# Patient Record
Sex: MALE | Race: WHITE | NOT HISPANIC OR LATINO | Employment: FULL TIME | URBAN - METROPOLITAN AREA
[De-identification: names, ages, dates, MRNs, and addresses within clinical notes are randomized per-mention and may not be internally consistent; named-entity substitution may affect disease eponyms.]

---

## 2023-07-27 ENCOUNTER — OFFICE VISIT (OUTPATIENT)
Dept: PRIMARY CARE | Facility: CLINIC | Age: 43
End: 2023-07-27
Payer: COMMERCIAL

## 2023-07-27 VITALS
TEMPERATURE: 98.2 F | DIASTOLIC BLOOD PRESSURE: 74 MMHG | BODY MASS INDEX: 26.98 KG/M2 | RESPIRATION RATE: 18 BRPM | OXYGEN SATURATION: 97 % | HEART RATE: 74 BPM | SYSTOLIC BLOOD PRESSURE: 118 MMHG | HEIGHT: 68 IN | WEIGHT: 178 LBS

## 2023-07-27 DIAGNOSIS — M16.12 PRIMARY OSTEOARTHRITIS OF LEFT HIP: ICD-10-CM

## 2023-07-27 DIAGNOSIS — M25.50 POLYARTHRALGIA: Primary | ICD-10-CM

## 2023-07-27 LAB
ALANINE AMINOTRANSFERASE (SGPT) (U/L) IN SER/PLAS: 17 U/L (ref 10–52)
ALBUMIN (G/DL) IN SER/PLAS: 4.2 G/DL (ref 3.4–5)
ALKALINE PHOSPHATASE (U/L) IN SER/PLAS: 83 U/L (ref 33–120)
ANION GAP IN SER/PLAS: 14 MMOL/L (ref 10–20)
ASPARTATE AMINOTRANSFERASE (SGOT) (U/L) IN SER/PLAS: 15 U/L (ref 9–39)
BILIRUBIN TOTAL (MG/DL) IN SER/PLAS: 0.4 MG/DL (ref 0–1.2)
CALCIDIOL (25 OH VITAMIN D3) (NG/ML) IN SER/PLAS: 20 NG/ML
CALCIUM (MG/DL) IN SER/PLAS: 9.2 MG/DL (ref 8.6–10.6)
CARBON DIOXIDE, TOTAL (MMOL/L) IN SER/PLAS: 26 MMOL/L (ref 21–32)
CHLORIDE (MMOL/L) IN SER/PLAS: 104 MMOL/L (ref 98–107)
CREATININE (MG/DL) IN SER/PLAS: 0.78 MG/DL (ref 0.5–1.3)
GFR MALE: >90 ML/MIN/1.73M2
GLUCOSE (MG/DL) IN SER/PLAS: 96 MG/DL (ref 74–99)
MAGNESIUM (MG/DL) IN SER/PLAS: 2.04 MG/DL (ref 1.6–2.4)
POTASSIUM (MMOL/L) IN SER/PLAS: 4.1 MMOL/L (ref 3.5–5.3)
PROTEIN TOTAL: 7.1 G/DL (ref 6.4–8.2)
SODIUM (MMOL/L) IN SER/PLAS: 140 MMOL/L (ref 136–145)
UREA NITROGEN (MG/DL) IN SER/PLAS: 22 MG/DL (ref 6–23)

## 2023-07-27 PROCEDURE — 80053 COMPREHEN METABOLIC PANEL: CPT

## 2023-07-27 PROCEDURE — 1036F TOBACCO NON-USER: CPT | Performed by: FAMILY MEDICINE

## 2023-07-27 PROCEDURE — 83735 ASSAY OF MAGNESIUM: CPT

## 2023-07-27 PROCEDURE — 99203 OFFICE O/P NEW LOW 30 MIN: CPT | Performed by: FAMILY MEDICINE

## 2023-07-27 PROCEDURE — 82306 VITAMIN D 25 HYDROXY: CPT

## 2023-07-27 RX ORDER — IBUPROFEN 200 MG
200 TABLET ORAL EVERY 6 HOURS
COMMUNITY

## 2023-07-27 NOTE — PROGRESS NOTES
"Subjective   Patient ID: Aftab Araiza is a 42 y.o. male who presents for [facility] (NEEDS HIP REPLACEMENT, ARTHRITIS AND NEED PCP).    HPI   Pt presents to [facility].  He is from [place] and does go back frequently.  Has history of generalized bone pain and OA.  Was diagnosed with OA in his left hip and was told he needed hip replacement which he hopes to get but unfortunately is on significant weight list.     Reports family hx of issues in his mom, maternal grandfather.  No diagnosis other than OA.  He has been tested for lupus, RA and reports all negative.  Thought to possibly be seronegative versus fibromyalgia.  Has constant bone pain in different locations.  Not associated with activity.  Denies any significant strenuous activities.     Review of Systems  Negative unless noted in HPI    Objective   /74 (BP Location: Right arm, Patient Position: Sitting)   Pulse 74   Temp 36.8 °C (98.2 °F) (Oral)   Resp 18   Ht 1.727 m (5' 8\")   Wt 80.7 kg (178 lb)   SpO2 97%   BMI 27.06 kg/m²     Physical Exam  Constitutional:       Appearance: Normal appearance.   Cardiovascular:      Rate and Rhythm: Normal rate and regular rhythm.   Pulmonary:      Effort: Pulmonary effort is normal.      Breath sounds: Normal breath sounds.   Neurological:      Mental Status: He is alert.   Psychiatric:         Mood and Affect: Mood normal.         Assessment/Plan   Problem List Items Addressed This Visit       Polyarthralgia - Primary     Check some basic labs including renal, hepatic function, mag, Vit D, calcium  Refer to rheumatology to reevaluate possibility of autoimmune disorder versus fibromyalgia         Relevant Orders    Referral to Rheumatology    Comprehensive Metabolic Panel    Vitamin D, Total    Magnesium    Primary osteoarthritis of left hip     Provided another referral for orthopedics to see if earlier surgical evaluation is possible         Relevant Orders    Referral to Orthopaedic Surgery          "

## 2023-07-27 NOTE — ASSESSMENT & PLAN NOTE
Check some basic labs including renal, hepatic function, mag, Vit D, calcium  Refer to rheumatology to reevaluate possibility of autoimmune disorder versus fibromyalgia

## 2023-10-12 PROBLEM — H90.11 CONDUCTIVE HEARING LOSS OF RIGHT EAR: Status: ACTIVE | Noted: 2023-10-12

## 2023-10-12 PROBLEM — H66.90 ACUTE OTITIS MEDIA: Status: ACTIVE | Noted: 2023-10-12

## 2023-10-12 PROBLEM — M79.643 PAIN IN HAND: Status: ACTIVE | Noted: 2023-10-12

## 2023-10-12 PROBLEM — M25.562 LEFT KNEE PAIN: Status: ACTIVE | Noted: 2023-10-12

## 2023-10-12 PROBLEM — G56.00 CARPAL TUNNEL SYNDROME: Status: ACTIVE | Noted: 2023-10-12

## 2023-10-12 PROBLEM — H72.00 CENTRAL PERFORATION OF TYMPANIC MEMBRANE: Status: ACTIVE | Noted: 2023-10-12

## 2023-10-12 PROBLEM — M25.562 LEFT KNEE PAIN: Status: RESOLVED | Noted: 2023-10-12 | Resolved: 2023-10-12

## 2023-10-12 PROBLEM — M25.462 EFFUSION OF LEFT KNEE: Status: ACTIVE | Noted: 2023-10-12

## 2023-10-12 PROBLEM — M25.579 ANKLE PAIN: Status: RESOLVED | Noted: 2023-10-12 | Resolved: 2023-10-12

## 2023-10-12 PROBLEM — H66.90 ACUTE OTITIS MEDIA: Status: RESOLVED | Noted: 2023-10-12 | Resolved: 2023-10-12

## 2023-10-12 PROBLEM — M25.551 RIGHT HIP PAIN: Status: ACTIVE | Noted: 2023-10-12

## 2023-10-12 PROBLEM — G56.20 CUBITAL TUNNEL SYNDROME: Status: ACTIVE | Noted: 2023-10-12

## 2023-10-12 PROBLEM — M79.643 PAIN IN HAND: Status: RESOLVED | Noted: 2023-10-12 | Resolved: 2023-10-12

## 2023-10-12 PROBLEM — M25.511 PAIN IN JOINT OF RIGHT SHOULDER: Status: RESOLVED | Noted: 2023-10-12 | Resolved: 2023-10-12

## 2023-10-12 PROBLEM — H65.90 NONSUPPURATIVE OTITIS MEDIA: Status: ACTIVE | Noted: 2023-10-12

## 2023-10-12 PROBLEM — M25.511 PAIN IN JOINT OF RIGHT SHOULDER: Status: ACTIVE | Noted: 2023-10-12

## 2023-10-12 PROBLEM — M06.9 RHEUMATOID ARTHRITIS (MULTI): Status: ACTIVE | Noted: 2023-10-12

## 2023-10-12 PROBLEM — H60.399 INFECTIVE OTITIS EXTERNA: Status: ACTIVE | Noted: 2023-10-12

## 2023-10-12 PROBLEM — M25.579 ANKLE PAIN: Status: ACTIVE | Noted: 2023-10-12

## 2023-10-13 ENCOUNTER — OFFICE VISIT (OUTPATIENT)
Dept: ORTHOPEDIC SURGERY | Facility: HOSPITAL | Age: 43
End: 2023-10-13
Payer: COMMERCIAL

## 2023-10-13 ENCOUNTER — HOSPITAL ENCOUNTER (OUTPATIENT)
Dept: RADIOLOGY | Facility: HOSPITAL | Age: 43
Discharge: HOME | End: 2023-10-13
Payer: COMMERCIAL

## 2023-10-13 VITALS — WEIGHT: 178 LBS | HEIGHT: 68 IN | BODY MASS INDEX: 26.98 KG/M2

## 2023-10-13 DIAGNOSIS — M06.9 RHEUMATOID ARTHRITIS INVOLVING LEFT KNEE, UNSPECIFIED WHETHER RHEUMATOID FACTOR PRESENT (MULTI): ICD-10-CM

## 2023-10-13 DIAGNOSIS — M25.562 LEFT KNEE PAIN, UNSPECIFIED CHRONICITY: ICD-10-CM

## 2023-10-13 PROCEDURE — 73560 X-RAY EXAM OF KNEE 1 OR 2: CPT | Mod: 50,FY

## 2023-10-13 PROCEDURE — 99213 OFFICE O/P EST LOW 20 MIN: CPT | Performed by: ORTHOPAEDIC SURGERY

## 2023-10-13 PROCEDURE — 73560 X-RAY EXAM OF KNEE 1 OR 2: CPT | Mod: BILATERAL PROCEDURE | Performed by: RADIOLOGY

## 2023-10-13 PROCEDURE — 1036F TOBACCO NON-USER: CPT | Performed by: ORTHOPAEDIC SURGERY

## 2023-10-13 RX ORDER — MELOXICAM 15 MG/1
15 TABLET ORAL DAILY
Qty: 14 TABLET | Refills: 0 | Status: SHIPPED | OUTPATIENT
Start: 2023-10-13 | End: 2023-10-27

## 2023-10-13 NOTE — PROGRESS NOTES
Evaluation of the left knee has had left knee tightness and pain and swelling intermittently also has osteoarthritis in his left hip And is scheduled for hip surgery in November.  Pain is primarily in the front of the left knee extends medially and laterally.  Is worse with activity better with rest does respond to ibuprofen.    The patient is pleasant and cooperative.  The patient is alert and oriented ×3.  Auditory function is intact.  The patient is a good historian.  The patient is not in acute distress.  Eye exam significant for nonicteric sclera, intact ocular muscle movement.  Breathing is rhythmic symmetric and nonlabored.  Patient is in generally very good physical condition he has a small effusion left knee.  He walks with a slight limp I believe this is from the hip.  His calf is soft supple and nontender his integument intact his tibialis posterior pulses palpable his left knee range of motion 0 to 120 degrees with slight pain on hyperflexion.  There is no direct tenderness no ligament pathologic laxity.    X-rays show tiny osteophyte lateral border of the left patella retropatellar and tibiofemoral spaces are well-preserved.    Left knee pain    I have recommended a course of anti-inflammatory medicines prescription for meloxicam was provided and the patient will call back in 1 week.  If he does not get full relief I would recommend an MRI of the left knee.    This was dictated using voice recognition software and not corrected for grammatical or spelling errors.

## 2023-10-31 ENCOUNTER — OFFICE VISIT (OUTPATIENT)
Dept: ORTHOPEDIC SURGERY | Facility: CLINIC | Age: 43
End: 2023-10-31
Payer: COMMERCIAL

## 2023-10-31 DIAGNOSIS — M65.331 ACQUIRED TRIGGER FINGER OF RIGHT MIDDLE FINGER: Primary | ICD-10-CM

## 2023-10-31 PROCEDURE — 99213 OFFICE O/P EST LOW 20 MIN: CPT | Performed by: ORTHOPAEDIC SURGERY

## 2023-10-31 PROCEDURE — 1036F TOBACCO NON-USER: CPT | Performed by: ORTHOPAEDIC SURGERY

## 2023-10-31 ASSESSMENT — ENCOUNTER SYMPTOMS
SHORTNESS OF BREATH: 0
CHILLS: 0
FEVER: 0
WHEEZING: 0
ARTHRALGIAS: 1
FATIGUE: 0

## 2023-10-31 ASSESSMENT — PAIN SCALES - GENERAL: PAINLEVEL_OUTOF10: 10 - WORST POSSIBLE PAIN

## 2023-10-31 ASSESSMENT — PAIN - FUNCTIONAL ASSESSMENT: PAIN_FUNCTIONAL_ASSESSMENT: 0-10

## 2023-10-31 NOTE — PROGRESS NOTES
Reason for Appointment  Chief Complaint   Patient presents with    Right Middle Finger - Pain     History of Present Illness  Patient is a 43 y.o. male here today for follow-up evaluation of a right long trigger finger. He is over 3 months s/p a right long trigger injection. This injection provided about 2 weeks of relief. He complains of pain and stiffness in the digit. He cannot bend the finger. Pain is isolated over the A1 pulley. No other updates to PMH, allergies, or medications.     History reviewed. No pertinent past medical history.    History reviewed. No pertinent surgical history.    Medication Documentation Review Audit       Reviewed by Tanya Baires MA (Medical Assistant) on 10/31/23 at 0952      Medication Order Taking? Sig Documenting Provider Last Dose Status   ibuprofen 200 mg tablet [id number] No Take 1 tablet (200 mg) by mouth every 6 hours. Historical Provider, MD Taking Active   meloxicam (Mobic) 15 mg tablet [id number]  Take 1 tablet (15 mg) by mouth once daily for 14 days. Jim Méndez MD   10/27/23 4783                    No Known Allergies    Review of Systems   Constitutional:  Negative for chills, fatigue and fever.   Respiratory:  Negative for shortness of breath and wheezing.    Cardiovascular:  Negative for chest pain and leg swelling.   Musculoskeletal:  Positive for arthralgias.   All other systems reviewed and are negative.    Exam   On exam of the right hand, isolated tenderness over the right long A1 pulley, no tenderness over other tendons, limited flexion in the digit, good pulses, good sensation    Assessment   Encounter Diagnosis   Name Primary?    Acquired trigger finger of right middle finger Yes       Plan   We had a long discussion about the risks, benefits, and alternatives to surgical trigger release. He has had two rounds of injections that have failed to provide long-term relief. Patient understands the procedure and the recovery fully. Plan for right  long trigger release under local. He is getting a hip replacement on Friday.     I, Capri Dunne, attest that this documentation has been prepared under the direction and in the presence of Marco Fuller MD. By signing below, I, Marco Fuller MD, personally performed the services described in this documentation. All medical record entries made by the scribe were at my direction and in my presence. I have reviewed the chart and agree that the record reflects my personal performance and is accurate and complete. 10/31/23

## 2023-11-09 ENCOUNTER — HOSPITAL ENCOUNTER (OUTPATIENT)
Facility: HOSPITAL | Age: 43
Setting detail: OUTPATIENT SURGERY
End: 2023-11-09
Attending: ORTHOPAEDIC SURGERY | Admitting: ORTHOPAEDIC SURGERY
Payer: COMMERCIAL

## 2023-11-09 PROBLEM — M65.331 ACQUIRED TRIGGER FINGER OF RIGHT MIDDLE FINGER: Status: ACTIVE | Noted: 2023-10-31

## 2023-11-21 ENCOUNTER — APPOINTMENT (OUTPATIENT)
Dept: PREADMISSION TESTING | Facility: HOSPITAL | Age: 43
End: 2023-11-21
Payer: COMMERCIAL

## 2023-11-26 ENCOUNTER — HOSPITAL ENCOUNTER (OUTPATIENT)
Facility: HOSPITAL | Age: 43
Setting detail: OUTPATIENT SURGERY
End: 2023-11-26
Attending: ORTHOPAEDIC SURGERY | Admitting: ORTHOPAEDIC SURGERY
Payer: COMMERCIAL

## 2023-11-26 ENCOUNTER — TRANSCRIBE ORDERS (OUTPATIENT)
Dept: OPERATING ROOM | Facility: HOSPITAL | Age: 43
End: 2023-11-26
Payer: COMMERCIAL

## 2023-11-26 DIAGNOSIS — M16.12 UNILATERAL PRIMARY OSTEOARTHRITIS, LEFT HIP: Primary | ICD-10-CM

## 2023-12-07 ENCOUNTER — APPOINTMENT (OUTPATIENT)
Dept: RADIOLOGY | Facility: CLINIC | Age: 43
End: 2023-12-07
Payer: COMMERCIAL

## 2023-12-10 DIAGNOSIS — M16.12 UNILATERAL PRIMARY OSTEOARTHRITIS, LEFT HIP: Primary | ICD-10-CM

## 2023-12-14 ENCOUNTER — APPOINTMENT (OUTPATIENT)
Dept: ORTHOPEDIC SURGERY | Facility: HOSPITAL | Age: 43
End: 2023-12-14
Payer: COMMERCIAL

## 2023-12-14 NOTE — PROGRESS NOTES
Subjective   Patient ID: Aftab Araiza is a 43 y.o. male who presents for Arthritis (NPV- Pt referred by Dr. Jim Méndez for joint pain in knees and ankles. Pt states had hip replacement x 1 month and all pain has subsided in knees and ankles. ).    HPI  Consult  Has seen orthopedics recently for L knee pain and trigger finger with injection     Lots of pain  Both elbow  Fc 6 yrs radha  L >r knee pain- 4 mos  Both ankles  But L tkr 1 mo ago now all pain gone!!! [facility] orthopedic  No off advil took for 2 years  L knee pain all 95% better  Trigger finger gone    ROS      Current Outpatient Medications   Medication Sig Dispense Refill    ibuprofen 200 mg tablet Take 1 tablet (200 mg) by mouth every 6 hours.       No current facility-administered medications for this visit.         has no past medical history on file.   No past surgical history on file.   Social History     Tobacco Use    Smoking status: Never    Smokeless tobacco: Never   Substance Use Topics    Alcohol use: Yes    Drug use: Never      family history is not on file.  Pain Management Panel           No data to display                 Vitals:    12/15/23 0937   BP: 157/87   Pulse: 76     Lab Results   Component Value Date    TSH 0.59 10/12/2017       PHYSICAL EXAM      NODES   HEART  LUNGS  ABDOMEN   VASCULAR  NEURO   SKIN  JOINTS about a 10 degree flexion contracture at both elbows with slight synovial thickening bilaterally  The remainder of his upper and lower extremity joint exam including all small and large joints were normal  There was no evidence for any active synovitis nor any evidence for trigger finger bilaterally.    PLAN  Patient referred for evaluation regarding questionable rheumatoid arthritis.    He has had multiple joint symptoms for 5 to 6 years  He has had flexion contractures of both elbow since that time  He was seen by rheumatologist in [place] and was both rheumatoid factor and anti-CCP negative about 5 years ago.    He  "had a left total hip replacement performed outside of [facility] and since that time all of his complaints of joint pain including elbow pain knee pain ankle pain and trigger finger have all disappeared.    On examination today he had some synovial thickening with flexion contracture at both elbows but no evidence for any active synovitis elsewhere.    While flexion contractures of the elbows strongly suggest rheumatoid arthritis; at this point he is totally and absolutely asymptomatic since his left total hip replacement.    I told him I could not explain why all of his joint abnormalities have disappeared since his left total hip replacement except that this could be a \"miracle\".  Lets hope that he has no further joint pain, but at this point there is no need for checking rheumatoid factor anti-CCP excetra.  I would be glad to see him back if his joint symptoms recur.          "

## 2023-12-15 ENCOUNTER — OFFICE VISIT (OUTPATIENT)
Dept: RHEUMATOLOGY | Facility: CLINIC | Age: 43
End: 2023-12-15
Payer: COMMERCIAL

## 2023-12-15 VITALS
HEART RATE: 76 BPM | DIASTOLIC BLOOD PRESSURE: 87 MMHG | SYSTOLIC BLOOD PRESSURE: 157 MMHG | BODY MASS INDEX: 27.83 KG/M2 | WEIGHT: 183 LBS

## 2023-12-15 DIAGNOSIS — M25.50 POLYARTHRALGIA: ICD-10-CM

## 2023-12-15 DIAGNOSIS — M24.529 FLEXION CONTRACTURE OF ELBOW, UNSPECIFIED LATERALITY: Primary | ICD-10-CM

## 2023-12-15 DIAGNOSIS — M06.9 RHEUMATOID ARTHRITIS INVOLVING LEFT KNEE, UNSPECIFIED WHETHER RHEUMATOID FACTOR PRESENT (MULTI): ICD-10-CM

## 2023-12-15 PROCEDURE — 99204 OFFICE O/P NEW MOD 45 MIN: CPT | Performed by: INTERNAL MEDICINE

## 2023-12-15 PROCEDURE — 1036F TOBACCO NON-USER: CPT | Performed by: INTERNAL MEDICINE

## 2024-01-16 ENCOUNTER — APPOINTMENT (OUTPATIENT)
Dept: PREADMISSION TESTING | Facility: HOSPITAL | Age: 44
End: 2024-01-16
Payer: COMMERCIAL

## 2024-02-02 ENCOUNTER — OFFICE VISIT (OUTPATIENT)
Dept: ORTHOPEDIC SURGERY | Facility: HOSPITAL | Age: 44
End: 2024-02-02
Payer: COMMERCIAL

## 2024-02-02 DIAGNOSIS — M25.562 LEFT KNEE PAIN, UNSPECIFIED CHRONICITY: Primary | ICD-10-CM

## 2024-02-02 PROCEDURE — 2500000004 HC RX 250 GENERAL PHARMACY W/ HCPCS (ALT 636 FOR OP/ED): Performed by: ORTHOPAEDIC SURGERY

## 2024-02-02 PROCEDURE — 1036F TOBACCO NON-USER: CPT | Performed by: ORTHOPAEDIC SURGERY

## 2024-02-02 PROCEDURE — 99214 OFFICE O/P EST MOD 30 MIN: CPT | Performed by: ORTHOPAEDIC SURGERY

## 2024-02-02 PROCEDURE — 20610 DRAIN/INJ JOINT/BURSA W/O US: CPT | Performed by: ORTHOPAEDIC SURGERY

## 2024-02-02 PROCEDURE — 2500000005 HC RX 250 GENERAL PHARMACY W/O HCPCS: Performed by: ORTHOPAEDIC SURGERY

## 2024-02-02 RX ORDER — LIDOCAINE HYDROCHLORIDE 10 MG/ML
4 INJECTION INFILTRATION; PERINEURAL
Status: COMPLETED | OUTPATIENT
Start: 2024-02-02 | End: 2024-02-02

## 2024-02-02 RX ORDER — TRIAMCINOLONE ACETONIDE 40 MG/ML
40 INJECTION, SUSPENSION INTRA-ARTICULAR; INTRAMUSCULAR
Status: COMPLETED | OUTPATIENT
Start: 2024-02-02 | End: 2024-02-02

## 2024-02-02 RX ADMIN — TRIAMCINOLONE ACETONIDE 40 MG: 400 INJECTION, SUSPENSION INTRA-ARTICULAR; INTRAMUSCULAR at 09:54

## 2024-02-02 RX ADMIN — LIDOCAINE HYDROCHLORIDE 4 ML: 10 INJECTION, SOLUTION INFILTRATION; PERINEURAL at 09:54

## 2024-02-02 NOTE — PROGRESS NOTES
Patient is here for reevaluation of his left knee he has recurrence of swelling he had his hip replaced and is doing very well.    Left knee manifests large effusion.  No joint line tenderness.  No crepitus on range of motion.  Slightly antalgic gait.    Left knee aspiration injection was performed today    Aspiration yielded 60 cc of slightly turbid hilario joint fluid.  Knee was injected with Xylocaine and Kenalog 40 mg.  The procedure was tolerated well.    Arthritis left knee    Patient has recurrent inflammatory fluid.  We the did request aspiration injection which was done today if he gets a good result he may follow-up as needed.

## 2024-02-02 NOTE — PROGRESS NOTES
L Inj/Asp: L knee on 2/2/2024 9:54 AM  Indications: pain  Details: 22 G needle, anterior approach  Medications: 40 mg triamcinolone acetonide 40 mg/mL; 4 mL lidocaine 10 mg/mL (1 %)  Aspirate: 58 mL yellow  Procedure, treatment alternatives, risks and benefits explained, specific risks discussed. Consent was given by the patient. Immediately prior to procedure a time out was called to verify the correct patient, procedure, equipment, support staff and site/side marked as required.

## 2024-03-15 ENCOUNTER — APPOINTMENT (OUTPATIENT)
Dept: ORTHOPEDIC SURGERY | Facility: CLINIC | Age: 44
End: 2024-03-15
Payer: COMMERCIAL

## 2024-04-29 ENCOUNTER — OFFICE VISIT (OUTPATIENT)
Dept: ORTHOPEDIC SURGERY | Facility: HOSPITAL | Age: 44
End: 2024-04-29
Payer: COMMERCIAL

## 2024-04-29 VITALS — HEIGHT: 68 IN | BODY MASS INDEX: 27.58 KG/M2 | WEIGHT: 182 LBS

## 2024-04-29 DIAGNOSIS — G89.29 CHRONIC PAIN OF LEFT KNEE: Primary | ICD-10-CM

## 2024-04-29 DIAGNOSIS — M25.562 CHRONIC PAIN OF LEFT KNEE: Primary | ICD-10-CM

## 2024-04-29 PROCEDURE — 2500000005 HC RX 250 GENERAL PHARMACY W/O HCPCS: Performed by: ORTHOPAEDIC SURGERY

## 2024-04-29 PROCEDURE — 99213 OFFICE O/P EST LOW 20 MIN: CPT | Performed by: ORTHOPAEDIC SURGERY

## 2024-04-29 PROCEDURE — 20610 DRAIN/INJ JOINT/BURSA W/O US: CPT | Mod: LT | Performed by: ORTHOPAEDIC SURGERY

## 2024-04-29 PROCEDURE — 2500000004 HC RX 250 GENERAL PHARMACY W/ HCPCS (ALT 636 FOR OP/ED): Performed by: ORTHOPAEDIC SURGERY

## 2024-04-29 PROCEDURE — 1036F TOBACCO NON-USER: CPT | Performed by: ORTHOPAEDIC SURGERY

## 2024-04-29 RX ORDER — LIDOCAINE HYDROCHLORIDE 10 MG/ML
1 INJECTION INFILTRATION; PERINEURAL
Status: COMPLETED | OUTPATIENT
Start: 2024-04-29 | End: 2024-04-29

## 2024-04-29 RX ORDER — TRIAMCINOLONE ACETONIDE 40 MG/ML
10 INJECTION, SUSPENSION INTRA-ARTICULAR; INTRAMUSCULAR
Status: COMPLETED | OUTPATIENT
Start: 2024-04-29 | End: 2024-04-29

## 2024-04-29 RX ORDER — LIDOCAINE HYDROCHLORIDE 10 MG/ML
4 INJECTION INFILTRATION; PERINEURAL
Status: COMPLETED | OUTPATIENT
Start: 2024-04-29 | End: 2024-04-29

## 2024-04-29 RX ORDER — TRIAMCINOLONE ACETONIDE 40 MG/ML
40 INJECTION, SUSPENSION INTRA-ARTICULAR; INTRAMUSCULAR
Status: COMPLETED | OUTPATIENT
Start: 2024-04-29 | End: 2024-04-29

## 2024-04-29 RX ADMIN — LIDOCAINE HYDROCHLORIDE 4 ML: 10 INJECTION, SOLUTION INFILTRATION; PERINEURAL at 09:10

## 2024-04-29 RX ADMIN — LIDOCAINE HYDROCHLORIDE 1 ML: 10 INJECTION, SOLUTION INFILTRATION; PERINEURAL at 09:16

## 2024-04-29 RX ADMIN — TRIAMCINOLONE ACETONIDE 40 MG: 400 INJECTION, SUSPENSION INTRA-ARTICULAR; INTRAMUSCULAR at 09:10

## 2024-04-29 RX ADMIN — TRIAMCINOLONE ACETONIDE 10 MG: 400 INJECTION, SUSPENSION INTRA-ARTICULAR; INTRAMUSCULAR at 09:16

## 2024-04-29 NOTE — PROGRESS NOTES
L Inj/Asp: L knee on 4/29/2024 9:10 AM  Indications: pain  Details: 22 G needle, anterior approach  Medications: 40 mg triamcinolone acetonide 40 mg/mL; 4 mL lidocaine 10 mg/mL (1 %)  Outcome: tolerated well, no immediate complications  Procedure, treatment alternatives, risks and benefits explained, specific risks discussed. Consent was given by the patient. Immediately prior to procedure a time out was called to verify the correct patient, procedure, equipment, support staff and site/side marked as required.

## 2024-04-29 NOTE — PROGRESS NOTES
Patient had a recurrence of swelling in his left knee.  He does not have much pain.    Left knee has a large effusion.  Range of motion is well-preserved 0 to 100 degrees.  Normal gait.  Not tender.    Left arthritis    Patient has recurrence of left knee effusion.  He did request aspiration injection.  Aspiration injection was performed today and tolerated well.  Aspiration yielded 85 cc of slightly turbid hilario joint fluid.  Injection was consisting of Xylocaine and Kenalog tolerated well.    Follow-up as needed.    L Inj/Asp: L knee on 4/29/2024 9:16 AM  Details: 18 G needle, lateral approach  Medications: 10 mg triamcinolone acetonide 40 mg/mL; 1 mL lidocaine 10 mg/mL (1 %)  Aspirate: yellow and cloudy

## 2024-07-03 ENCOUNTER — APPOINTMENT (OUTPATIENT)
Dept: ORTHOPEDIC SURGERY | Facility: HOSPITAL | Age: 44
End: 2024-07-03
Payer: COMMERCIAL

## 2024-07-12 ENCOUNTER — OFFICE VISIT (OUTPATIENT)
Dept: ORTHOPEDIC SURGERY | Facility: HOSPITAL | Age: 44
End: 2024-07-12
Payer: COMMERCIAL

## 2024-07-12 DIAGNOSIS — G89.29 CHRONIC PAIN OF LEFT KNEE: Primary | ICD-10-CM

## 2024-07-12 DIAGNOSIS — M25.562 CHRONIC PAIN OF LEFT KNEE: Primary | ICD-10-CM

## 2024-07-12 PROCEDURE — 2500000004 HC RX 250 GENERAL PHARMACY W/ HCPCS (ALT 636 FOR OP/ED): Performed by: ORTHOPAEDIC SURGERY

## 2024-07-12 PROCEDURE — 2500000005 HC RX 250 GENERAL PHARMACY W/O HCPCS: Performed by: ORTHOPAEDIC SURGERY

## 2024-07-12 PROCEDURE — 99214 OFFICE O/P EST MOD 30 MIN: CPT | Performed by: ORTHOPAEDIC SURGERY

## 2024-07-12 PROCEDURE — 20610 DRAIN/INJ JOINT/BURSA W/O US: CPT | Mod: LT | Performed by: ORTHOPAEDIC SURGERY

## 2024-07-12 RX ORDER — MELOXICAM 15 MG/1
15 TABLET ORAL DAILY
Qty: 10 TABLET | Refills: 1 | Status: SHIPPED | OUTPATIENT
Start: 2024-07-12 | End: 2024-08-01

## 2024-07-12 RX ORDER — LIDOCAINE HYDROCHLORIDE 10 MG/ML
1 INJECTION INFILTRATION; PERINEURAL
Status: COMPLETED | OUTPATIENT
Start: 2024-07-12 | End: 2024-07-12

## 2024-07-12 RX ORDER — TRIAMCINOLONE ACETONIDE 40 MG/ML
10 INJECTION, SUSPENSION INTRA-ARTICULAR; INTRAMUSCULAR
Status: COMPLETED | OUTPATIENT
Start: 2024-07-12 | End: 2024-07-12

## 2024-07-12 NOTE — PROGRESS NOTES
Patient is return for reevaluation of his left knee he has had recurrence of swelling and pain.  He has a history of osteoarthritis.  Previous aspiration injection was helpful.  He would like to have it done again.    Left knee exam reveals a large effusion range of motion 0 to 100 degrees.  Slight limp.  Intact integument no ligament pathologic laxity.    Left knee osteoarthritis    We discussed options for treatment I recommended aspiration injection which was performed today which yielded about 60 cc of clear hilario fluid, and tolerated very well by the patient.  Patient will follow-up as needed if he has recurrence relatively soon I would have him consider hyaluronic acid injection series.  In addition the patient been given a prescription for meloxicam for any further flareups in the future    This was dictated using voice recognition software and not corrected for grammatical or spelling errors.    L Inj/Asp: L knee on 7/12/2024 10:11 AM  Details: 25 G needle, superolateral approach  Medications: 10 mg triamcinolone acetonide 40 mg/mL; 1 mL lidocaine 10 mg/mL (1 %)

## 2024-07-22 ENCOUNTER — OFFICE VISIT (OUTPATIENT)
Dept: ORTHOPEDIC SURGERY | Facility: HOSPITAL | Age: 44
End: 2024-07-22
Payer: COMMERCIAL

## 2024-07-22 DIAGNOSIS — M25.562 LEFT KNEE PAIN, UNSPECIFIED CHRONICITY: Primary | ICD-10-CM

## 2024-07-22 PROCEDURE — 99212 OFFICE O/P EST SF 10 MIN: CPT | Performed by: ORTHOPAEDIC SURGERY

## 2024-07-22 NOTE — PROGRESS NOTES
Patient has recurrence of swelling in his knee tried patches which have not helped.    Left knee definite effusion.  Range of motion 0 to 100 degrees.    Left knee osteoarthritis    Apparently the patient is not taking meloxicam.  I would like him to start taking a and we will have him follow-up with Dr. Best Reed for hyaluronic Injection Series.    This was dictated using voice recognition software and not corrected for grammatical or spelling errors.

## 2024-08-19 ENCOUNTER — APPOINTMENT (OUTPATIENT)
Dept: ORTHOPEDIC SURGERY | Facility: HOSPITAL | Age: 44
End: 2024-08-19
Payer: COMMERCIAL

## 2024-08-22 ENCOUNTER — APPOINTMENT (OUTPATIENT)
Dept: ORTHOPEDIC SURGERY | Facility: HOSPITAL | Age: 44
End: 2024-08-22
Payer: COMMERCIAL

## 2024-10-22 ENCOUNTER — CLINICAL SUPPORT (OUTPATIENT)
Dept: PEDIATRICS | Facility: CLINIC | Age: 44
End: 2024-10-22
Payer: COMMERCIAL

## 2024-10-22 DIAGNOSIS — Z23 ENCOUNTER FOR IMMUNIZATION: ICD-10-CM

## 2024-10-22 PROCEDURE — 90471 IMMUNIZATION ADMIN: CPT | Performed by: PEDIATRICS

## 2024-10-22 PROCEDURE — 90656 IIV3 VACC NO PRSV 0.5 ML IM: CPT | Performed by: PEDIATRICS

## 2024-10-22 NOTE — PROGRESS NOTES
Has the patient already received the influenza vaccine this season?  NO     Is the patient LESS than 6 months in age?  NO     Does the patient have an allergy to the influenza vaccine?  NO     Has the patient received a solid organ transplant in the past 3 months?  NO     Has the patient had anaphylaxis to gelatin or eggs?  NO     Does the patient have an allergy to Gentamicin?  NO     Has the patient been diagnosed with Guillain-Gardiner within 6 weeks after a previous flu vaccine?  NO

## 2024-11-07 PROBLEM — R11.2 PONV (POSTOPERATIVE NAUSEA AND VOMITING): Status: ACTIVE | Noted: 2023-10-20

## 2024-11-07 PROBLEM — Z96.642 S/P TOTAL LEFT HIP ARTHROPLASTY: Status: ACTIVE | Noted: 2023-12-06

## 2024-11-07 PROBLEM — Z98.890 PONV (POSTOPERATIVE NAUSEA AND VOMITING): Status: ACTIVE | Noted: 2023-10-20

## 2024-11-07 PROBLEM — G56.20 ULNAR NERVE ENTRAPMENT AT ELBOW: Status: ACTIVE | Noted: 2024-11-07

## 2024-11-07 PROBLEM — M16.12 PRIMARY OSTEOARTHRITIS OF LEFT HIP: Status: ACTIVE | Noted: 2023-09-20

## 2024-11-07 PROBLEM — M25.559 ARTHRALGIA OF HIP: Status: ACTIVE | Noted: 2023-10-12

## 2024-11-07 PROBLEM — H66.90 ACUTE OTITIS MEDIA: Status: ACTIVE | Noted: 2023-10-12

## 2024-11-08 ENCOUNTER — APPOINTMENT (OUTPATIENT)
Dept: PRIMARY CARE | Facility: CLINIC | Age: 44
End: 2024-11-08
Payer: COMMERCIAL

## 2024-11-08 VITALS
OXYGEN SATURATION: 100 % | BODY MASS INDEX: 27.34 KG/M2 | WEIGHT: 180.4 LBS | HEART RATE: 76 BPM | HEIGHT: 68 IN | SYSTOLIC BLOOD PRESSURE: 122 MMHG | DIASTOLIC BLOOD PRESSURE: 90 MMHG | TEMPERATURE: 97.7 F

## 2024-11-08 DIAGNOSIS — F42.9 OBSESSIVE-COMPULSIVE DISORDER, UNSPECIFIED TYPE: ICD-10-CM

## 2024-11-08 DIAGNOSIS — Z00.00 HEALTH MAINTENANCE EXAMINATION: Primary | ICD-10-CM

## 2024-11-08 PROBLEM — M25.462 EFFUSION OF LEFT KNEE: Status: RESOLVED | Noted: 2023-10-12 | Resolved: 2024-11-08

## 2024-11-08 PROBLEM — M25.50 POLYARTHRALGIA: Status: RESOLVED | Noted: 2023-07-27 | Resolved: 2024-11-08

## 2024-11-08 PROBLEM — M16.12 PRIMARY OSTEOARTHRITIS OF LEFT HIP: Status: RESOLVED | Noted: 2023-09-20 | Resolved: 2024-11-08

## 2024-11-08 PROBLEM — G56.00 CARPAL TUNNEL SYNDROME: Status: RESOLVED | Noted: 2023-10-12 | Resolved: 2024-11-08

## 2024-11-08 PROBLEM — M16.12 OSTEOARTHRITIS OF LEFT HIP: Status: RESOLVED | Noted: 2023-07-27 | Resolved: 2024-11-08

## 2024-11-08 PROBLEM — H66.90 ACUTE OTITIS MEDIA: Status: RESOLVED | Noted: 2023-10-12 | Resolved: 2024-11-08

## 2024-11-08 PROBLEM — H60.399 INFECTIVE OTITIS EXTERNA: Status: RESOLVED | Noted: 2023-10-12 | Resolved: 2024-11-08

## 2024-11-08 PROBLEM — R11.2 PONV (POSTOPERATIVE NAUSEA AND VOMITING): Status: RESOLVED | Noted: 2023-10-20 | Resolved: 2024-11-08

## 2024-11-08 PROBLEM — Z98.890 PONV (POSTOPERATIVE NAUSEA AND VOMITING): Status: RESOLVED | Noted: 2023-10-20 | Resolved: 2024-11-08

## 2024-11-08 PROBLEM — M65.331 ACQUIRED TRIGGER FINGER OF RIGHT MIDDLE FINGER: Status: RESOLVED | Noted: 2023-10-31 | Resolved: 2024-11-08

## 2024-11-08 PROBLEM — M25.559 ARTHRALGIA OF HIP: Status: RESOLVED | Noted: 2023-10-12 | Resolved: 2024-11-08

## 2024-11-08 LAB
25(OH)D3 SERPL-MCNC: 28 NG/ML (ref 30–100)
ALBUMIN SERPL BCP-MCNC: 4.5 G/DL (ref 3.4–5)
ALP SERPL-CCNC: 92 U/L (ref 33–120)
ALT SERPL W P-5'-P-CCNC: 39 U/L (ref 10–52)
ANION GAP SERPL CALC-SCNC: 12 MMOL/L (ref 10–20)
AST SERPL W P-5'-P-CCNC: 23 U/L (ref 9–39)
BILIRUB SERPL-MCNC: 0.4 MG/DL (ref 0–1.2)
BUN SERPL-MCNC: 22 MG/DL (ref 6–23)
CALCIUM SERPL-MCNC: 9.7 MG/DL (ref 8.6–10.6)
CHLORIDE SERPL-SCNC: 103 MMOL/L (ref 98–107)
CHOLEST SERPL-MCNC: 195 MG/DL (ref 0–199)
CHOLESTEROL/HDL RATIO: 3.4
CO2 SERPL-SCNC: 27 MMOL/L (ref 21–32)
CREAT SERPL-MCNC: 0.82 MG/DL (ref 0.5–1.3)
EGFRCR SERPLBLD CKD-EPI 2021: >90 ML/MIN/1.73M*2
ERYTHROCYTE [DISTWIDTH] IN BLOOD BY AUTOMATED COUNT: 15.3 % (ref 11.5–14.5)
GLUCOSE SERPL-MCNC: 105 MG/DL (ref 74–99)
HCT VFR BLD AUTO: 42.9 % (ref 41–52)
HDLC SERPL-MCNC: 56.6 MG/DL
HGB BLD-MCNC: 13.5 G/DL (ref 13.5–17.5)
LDLC SERPL CALC-MCNC: 130 MG/DL
MCH RBC QN AUTO: 24.7 PG (ref 26–34)
MCHC RBC AUTO-ENTMCNC: 31.5 G/DL (ref 32–36)
MCV RBC AUTO: 79 FL (ref 80–100)
NON HDL CHOLESTEROL: 138 MG/DL (ref 0–149)
NRBC BLD-RTO: 0 /100 WBCS (ref 0–0)
PLATELET # BLD AUTO: 333 X10*3/UL (ref 150–450)
POTASSIUM SERPL-SCNC: 4.4 MMOL/L (ref 3.5–5.3)
PROT SERPL-MCNC: 7.4 G/DL (ref 6.4–8.2)
RBC # BLD AUTO: 5.46 X10*6/UL (ref 4.5–5.9)
SODIUM SERPL-SCNC: 138 MMOL/L (ref 136–145)
TRIGL SERPL-MCNC: 40 MG/DL (ref 0–149)
TSH SERPL-ACNC: 0.6 MIU/L (ref 0.44–3.98)
VLDL: 8 MG/DL (ref 0–40)
WBC # BLD AUTO: 6.6 X10*3/UL (ref 4.4–11.3)

## 2024-11-08 PROCEDURE — 1036F TOBACCO NON-USER: CPT | Performed by: FAMILY MEDICINE

## 2024-11-08 PROCEDURE — 83540 ASSAY OF IRON: CPT

## 2024-11-08 PROCEDURE — 82306 VITAMIN D 25 HYDROXY: CPT

## 2024-11-08 PROCEDURE — 85027 COMPLETE CBC AUTOMATED: CPT

## 2024-11-08 PROCEDURE — 80061 LIPID PANEL: CPT

## 2024-11-08 PROCEDURE — 3008F BODY MASS INDEX DOCD: CPT | Performed by: FAMILY MEDICINE

## 2024-11-08 PROCEDURE — 83550 IRON BINDING TEST: CPT

## 2024-11-08 PROCEDURE — 80053 COMPREHEN METABOLIC PANEL: CPT

## 2024-11-08 PROCEDURE — 84443 ASSAY THYROID STIM HORMONE: CPT

## 2024-11-08 PROCEDURE — 99396 PREV VISIT EST AGE 40-64: CPT | Performed by: FAMILY MEDICINE

## 2024-11-08 ASSESSMENT — ANXIETY QUESTIONNAIRES
7. FEELING AFRAID AS IF SOMETHING AWFUL MIGHT HAPPEN: MORE THAN HALF THE DAYS
3. WORRYING TOO MUCH ABOUT DIFFERENT THINGS: SEVERAL DAYS
5. BEING SO RESTLESS THAT IT IS HARD TO SIT STILL: NOT AT ALL
GAD7 TOTAL SCORE: 8
2. NOT BEING ABLE TO STOP OR CONTROL WORRYING: NOT AT ALL
IF YOU CHECKED OFF ANY PROBLEMS ON THIS QUESTIONNAIRE, HOW DIFFICULT HAVE THESE PROBLEMS MADE IT FOR YOU TO DO YOUR WORK, TAKE CARE OF THINGS AT HOME, OR GET ALONG WITH OTHER PEOPLE: NOT DIFFICULT AT ALL
1. FEELING NERVOUS, ANXIOUS, OR ON EDGE: SEVERAL DAYS
4. TROUBLE RELAXING: NEARLY EVERY DAY
6. BECOMING EASILY ANNOYED OR IRRITABLE: SEVERAL DAYS

## 2024-11-08 ASSESSMENT — PATIENT HEALTH QUESTIONNAIRE - PHQ9
SUM OF ALL RESPONSES TO PHQ9 QUESTIONS 1 AND 2: 0
1. LITTLE INTEREST OR PLEASURE IN DOING THINGS: NOT AT ALL
2. FEELING DOWN, DEPRESSED OR HOPELESS: NOT AT ALL

## 2024-11-08 ASSESSMENT — ENCOUNTER SYMPTOMS
DEPRESSION: 0
LOSS OF SENSATION IN FEET: 0
OCCASIONAL FEELINGS OF UNSTEADINESS: 0

## 2024-11-08 NOTE — PROGRESS NOTES
Reason for Visit: Annual Physical Exam    HPI:  Reports a lot of increased stress recently, work promotion.  Had a very atypical 4 days when he felt that he was disassociating, felt overwhelmed.  He had a telemedicine encounter with a psychiatrist and he diagnosed him with OCD and prescribed a medication, recommend psychotherapy.  Pt is feeling somewhat better.    Active Problem List  Patient Active Problem List   Diagnosis    Central perforation of tympanic membrane    Conductive hearing loss of right ear    Cubital tunnel syndrome    Rheumatoid arthritis    S/P total left hip arthroplasty    Ulnar nerve entrapment at elbow    Health maintenance examination    Obsessive-compulsive disorder       Comprehensive Medical/Surgical/Social/Family History  Past Medical History:   Diagnosis Date    Acquired trigger finger of right middle finger 10/31/2023    Arthralgia of hip 10/12/2023    Carpal tunnel syndrome 10/12/2023    Effusion of left knee 10/12/2023    Infective otitis externa 10/12/2023    Osteoarthritis of left hip 07/27/2023    Polyarthralgia 07/27/2023    PONV (postoperative nausea and vomiting) 10/20/2023    Primary osteoarthritis of left hip 09/20/2023     History reviewed. No pertinent surgical history.  Social History     Tobacco Use    Smoking status: Never    Smokeless tobacco: Never   Substance Use Topics    Alcohol use: Not Currently    Drug use: Never     No family history on file.      Allergies and Medications  Patient has no known allergies.  Current Outpatient Medications on File Prior to Visit   Medication Sig Dispense Refill    cholecalciferol, vitamin D3, (VITAMIN D3 ORAL) Take by mouth.      ibuprofen 200 mg tablet Take 1 tablet (200 mg) by mouth every 6 hours. (Patient not taking: Reported on 11/8/2024)       No current facility-administered medications on file prior to visit.         ROS otherwise negative aside from what was mentioned above in HPI.    Vitals  /90   Pulse 76   Temp  "36.5 °C (97.7 °F) (Temporal)   Ht 1.727 m (5' 8\")   Wt 81.8 kg (180 lb 6.4 oz)   SpO2 100%   BMI 27.43 kg/m²   Body mass index is 27.43 kg/m².  Physical Exam  Gen: Alert, NAD  Respiratory:  Lungs CTAB  Cardiovascular:  Heart RRR. No M/R/G  Extremities:  FROM all extremities; Muscle strength grossly normal with good tone  Neuro:  CN II-XII intact; Gross motor and sensory intact      Assessment and Plan:  Problem List Items Addressed This Visit       Health maintenance examination - Primary    Current Assessment & Plan     Recommended screening guidelines addressed and orders placed as indicated by age and chronic conditions  Screening labs ordered, will call with results  Continue to work on healthy lifestyle including well balanced diet, regular activity, limit alcohol, no tobacco products   Follow up annually           Relevant Orders    CBC    Comprehensive Metabolic Panel    Lipid Panel    TSH with reflex to Free T4 if abnormal    Vitamin D 25-Hydroxy,Total (for eval of Vitamin D levels)    Obsessive-compulsive disorder    Current Assessment & Plan     Agree with trial of medication for symptoms  Would also recommend therapy, referral provided         Relevant Orders    Referral to Psychology         Akilah Mora MD    "

## 2024-11-08 NOTE — ASSESSMENT & PLAN NOTE
Recommended screening guidelines addressed and orders placed as indicated by age and chronic conditions  Screening labs ordered, will call with results  Continue to work on healthy lifestyle including well balanced diet, regular activity, limit alcohol, no tobacco products   Follow up annually

## 2024-11-13 DIAGNOSIS — Z00.00 HEALTH MAINTENANCE EXAMINATION: Primary | ICD-10-CM

## 2024-11-13 LAB
IRON SATN MFR SERPL: 10 % (ref 25–45)
IRON SERPL-MCNC: 43 UG/DL (ref 35–150)
TIBC SERPL-MCNC: 451 UG/DL (ref 240–445)
UIBC SERPL-MCNC: 408 UG/DL (ref 110–370)

## 2024-11-14 DIAGNOSIS — F42.9 OBSESSIVE-COMPULSIVE DISORDER, UNSPECIFIED TYPE: Primary | ICD-10-CM

## 2024-11-14 DIAGNOSIS — F42.9 OBSESSIVE-COMPULSIVE DISORDER, UNSPECIFIED TYPE: ICD-10-CM

## 2024-11-14 RX ORDER — CLOMIPRAMINE HYDROCHLORIDE 25 MG/1
CAPSULE ORAL
Qty: 60 CAPSULE | Refills: 1 | Status: SHIPPED | OUTPATIENT
Start: 2024-11-14

## 2024-11-14 RX ORDER — CLOMIPRAMINE HYDROCHLORIDE 25 MG/1
CAPSULE ORAL
Qty: 60 CAPSULE | Refills: 1 | Status: SHIPPED | OUTPATIENT
Start: 2024-11-14 | End: 2024-11-14

## 2024-12-14 DIAGNOSIS — F42.9 OBSESSIVE-COMPULSIVE DISORDER, UNSPECIFIED TYPE: ICD-10-CM

## 2024-12-14 RX ORDER — CLOMIPRAMINE HYDROCHLORIDE 25 MG/1
CAPSULE ORAL
Qty: 180 CAPSULE | Refills: 1 | Status: CANCELLED | OUTPATIENT
Start: 2024-12-14

## 2024-12-17 RX ORDER — CLOMIPRAMINE HYDROCHLORIDE 25 MG/1
CAPSULE ORAL
Qty: 180 CAPSULE | Refills: 1 | Status: SHIPPED | OUTPATIENT
Start: 2024-12-17 | End: 2024-12-18

## 2024-12-18 DIAGNOSIS — F42.9 OBSESSIVE-COMPULSIVE DISORDER, UNSPECIFIED TYPE: ICD-10-CM

## 2024-12-18 RX ORDER — CLOMIPRAMINE HYDROCHLORIDE 50 MG/1
CAPSULE ORAL
Qty: 90 CAPSULE | Refills: 1 | Status: SHIPPED | OUTPATIENT
Start: 2024-12-18

## 2025-01-01 DIAGNOSIS — F42.9 OBSESSIVE-COMPULSIVE DISORDER, UNSPECIFIED TYPE: ICD-10-CM

## 2025-01-01 RX ORDER — CLOMIPRAMINE HYDROCHLORIDE 50 MG/1
100 CAPSULE ORAL NIGHTLY
Qty: 180 CAPSULE | Refills: 0 | Status: SHIPPED | OUTPATIENT
Start: 2025-01-01 | End: 2025-04-01

## 2025-03-03 ENCOUNTER — APPOINTMENT (OUTPATIENT)
Dept: ORTHOPEDIC SURGERY | Facility: HOSPITAL | Age: 45
End: 2025-03-03
Payer: COMMERCIAL

## 2025-03-07 ENCOUNTER — APPOINTMENT (OUTPATIENT)
Dept: ORTHOPEDIC SURGERY | Facility: CLINIC | Age: 45
End: 2025-03-07
Payer: COMMERCIAL

## 2025-03-07 ENCOUNTER — HOSPITAL ENCOUNTER (OUTPATIENT)
Dept: RADIOLOGY | Facility: CLINIC | Age: 45
Discharge: HOME | End: 2025-03-07
Payer: COMMERCIAL

## 2025-03-07 DIAGNOSIS — M25.462 KNEE EFFUSION, LEFT: ICD-10-CM

## 2025-03-07 DIAGNOSIS — M25.562 LEFT KNEE PAIN, UNSPECIFIED CHRONICITY: ICD-10-CM

## 2025-03-07 PROCEDURE — 73564 X-RAY EXAM KNEE 4 OR MORE: CPT | Mod: LT

## 2025-03-07 RX ORDER — LIDOCAINE HYDROCHLORIDE 10 MG/ML
4 INJECTION, SOLUTION INFILTRATION; PERINEURAL
Status: COMPLETED | OUTPATIENT
Start: 2025-03-07 | End: 2025-03-07

## 2025-03-07 RX ORDER — METHYLPREDNISOLONE 4 MG/1
4 TABLET ORAL ONCE
Qty: 1 TABLET | Refills: 0 | Status: SHIPPED | OUTPATIENT
Start: 2025-03-07 | End: 2025-03-07

## 2025-03-07 RX ADMIN — LIDOCAINE HYDROCHLORIDE 4 ML: 10 INJECTION, SOLUTION INFILTRATION; PERINEURAL at 08:45

## 2025-03-07 NOTE — PROGRESS NOTES
Aftab Araiza  is a 44 y.o. year-old  male. he  is a new patient to our office and presents with a chief complaint of Left  knee pain.  Over the years he is seen my partner for recurrent effusions.  Typically gets an aspiration and injection.  He is got a some known arthritis in the knee.  He also had his hip replaced.  He says he has been tested for lupus and rheumatoid which has been negative.  He also mentions though that he does get psoriatic rashes on his elbows has not formally been tested for psoriatic type arthritis. He did have a recent steroid injection in this knee in [facility] in January.      Past Medical, Family, and Social History reviewed   Review of Systems  A complete review of systems was conducted, pertinent only to the HPI noted above.  Constitutional: None  Eyes: No additions to above history  Ears, Nose, Throat: No additions to above history  Cardiovascular: No additions to above history  Respiratory: No additions to above history  GI: No additions to above history  : No additions to above history  Skin/Neuro: No additions to above history  Endocrine/Heme/Lymph: No additions to above history  Immunologic: No additions to above history  Psychiatric: No additions to above history  Musculoskeletal: see above    GEN: Alert and Oriented x 3  Constitutional: Well appearing , in no apparent distress.  Skin: No rashes, erythema, or induration around knee    MUSCULOSKELETAL EXAM:     Left KNEE:  ROM: 0/0/130  Effusion: positive large  Alignment: [neutral]      Gait: [normal]  Sensation intact bilaterally sural/saphenous/sp/dp/tibial nerve bilaterally  Motor 5/5 knee flexion/extension/foot DF/PF/EHL/FHL bilaterally  Palpable/symmetric DP and PT pulse bilaterally      PATELLAR/EXTENSOR MECHANISM:   KNEE:  Patellar Crepitus: n  Patellar Compression Pain:n  Patellar Apprehension: [no]  Extensor Mechanism: [intact]  Straight Leg Raise: good      LIGAMENTS:  ACL: Lachmans: [negative]  PCL: [stable]  Valgus at 0  degrees: [stable]  Valgus at 30 degrees: [stable]  Varus at 0 degrees: [stable]  Varus at 30 degrees: [stable]    MENISCUS EXAM:  Joint Line Tenderness:medial joint line tenderness  McMurrays: [negative]  Pain with Deep Flexion: [No]    Xrays independently viewed and interpreted: Mild degenerative changes particularly in the patellofemoral    L Inj/Asp: L knee on 3/7/2025 8:45 AM  Indications: pain  Details: 21 G needle, anterolateral approach  Medications: 4 mL lidocaine 10 mg/mL (1 %)  Aspirate: 100 mL yellow  Outcome: tolerated well, no immediate complications  Procedure, treatment alternatives, risks and benefits explained, specific risks discussed. Consent was given by the patient. Immediately prior to procedure a time out was called to verify the correct patient, procedure, equipment, support staff and site/side marked as required. Patient was prepped and draped in the usual sterile fashion.       I reviewed with the patient his recurrent effusions.  He does have some mild arthritic change on his x-rays however it is pretty mild and he is getting quite large recurrent effusions.  Given his psoriatic rashes I discussed the possibility that this is a psoriatic type arthritis.  I did aspirate and inject his knee today.  Given he had a recent steroid in January I did not inject steroid intra-articularly.  We have given him a Medrol Dosepak to help prevent recurrence.  We have also given him a referral to rheumatology for potential further workup of that.  All questions were answered he is in agreement with that plan

## 2025-07-18 ENCOUNTER — APPOINTMENT (OUTPATIENT)
Dept: ORTHOPEDIC SURGERY | Facility: CLINIC | Age: 45
End: 2025-07-18
Payer: COMMERCIAL

## 2025-07-25 ENCOUNTER — APPOINTMENT (OUTPATIENT)
Dept: ORTHOPEDIC SURGERY | Facility: CLINIC | Age: 45
End: 2025-07-25
Payer: COMMERCIAL

## 2025-07-30 PROBLEM — M06.9 RHEUMATOID ARTHRITIS: Status: RESOLVED | Noted: 2023-10-12 | Resolved: 2025-07-30

## 2025-07-30 PROBLEM — Z00.00 HEALTH MAINTENANCE EXAMINATION: Status: RESOLVED | Noted: 2024-11-08 | Resolved: 2025-07-30

## 2025-07-30 PROBLEM — G56.20 CUBITAL TUNNEL SYNDROME: Status: RESOLVED | Noted: 2023-10-12 | Resolved: 2025-07-30

## 2025-07-30 PROBLEM — H72.00 CENTRAL PERFORATION OF TYMPANIC MEMBRANE: Status: RESOLVED | Noted: 2023-10-12 | Resolved: 2025-07-30

## 2025-07-30 PROBLEM — G56.20 ULNAR NERVE ENTRAPMENT AT ELBOW: Status: RESOLVED | Noted: 2024-11-07 | Resolved: 2025-07-30

## 2025-07-31 ENCOUNTER — APPOINTMENT (OUTPATIENT)
Dept: PRIMARY CARE | Facility: CLINIC | Age: 45
End: 2025-07-31
Payer: COMMERCIAL

## 2025-09-02 ENCOUNTER — APPOINTMENT (OUTPATIENT)
Dept: ORTHOPEDIC SURGERY | Facility: CLINIC | Age: 45
End: 2025-09-02
Payer: COMMERCIAL

## 2025-09-05 ENCOUNTER — APPOINTMENT (OUTPATIENT)
Dept: ORTHOPEDIC SURGERY | Facility: CLINIC | Age: 45
End: 2025-09-05
Payer: COMMERCIAL

## 2025-09-12 ENCOUNTER — APPOINTMENT (OUTPATIENT)
Dept: ORTHOPEDIC SURGERY | Facility: CLINIC | Age: 45
End: 2025-09-12
Payer: COMMERCIAL

## 2025-09-16 ENCOUNTER — APPOINTMENT (OUTPATIENT)
Dept: PRIMARY CARE | Facility: CLINIC | Age: 45
End: 2025-09-16
Payer: COMMERCIAL